# Patient Record
(demographics unavailable — no encounter records)

---

## 2025-01-27 NOTE — PROCEDURE
[Abnormal Uterine Bleeding] : abnormal uterine bleeding [Transvaginal Ultrasound] : transvaginal ultrasound [FreeTextEntry3] : polyp essentially unchanged trace circ: 3.44 cm all ok no free fluid cervix normal [FreeTextEntry5] : 71. 3cc, 4 mm [FreeTextEntry7] : 2.6 cc [FreeTextEntry8] : 2.0 cc

## 2025-01-27 NOTE — PHYSICAL EXAM
[Chaperone Present] : A chaperone was present in the examining room during all aspects of the physical examination [[id number]] : A chaperone was present during the pelvic exam. [FreeTextEntry2] : luna [Labia Majora] : normal [Labia Minora] : normal [Normal] : normal [Enlarged ___ wks] : enlarged [unfilled] ~Uweeks [Uterine Adnexae] : normal

## 2025-07-28 NOTE — HISTORY OF PRESENT ILLNESS
[Irregular Menstrual Interval] : irregular menstrual interval [FreeTextEntry1] : HERE FOR HER ANNUAL SUSPECTED UTI  PERIODS IRREGULAR

## 2025-07-28 NOTE — PROCEDURE
[Abnormal Uterine Bleeding] : abnormal uterine bleeding [Transvaginal Ultrasound] : transvaginal ultrasound [FreeTextEntry3] : UTERUS SMALLER NO FREE FLUID LINING THIN CERVIX NORMAL [FreeTextEntry5] : 60 CC VOL,  3 MM [FreeTextEntry7] : 2.3 CC [FreeTextEntry8] : 2.1 CC

## 2025-07-28 NOTE — PHYSICAL EXAM
[Chaperoned Physical Exam] : A chaperone was present in the examining room during all aspects of the physical examination. [MA] : MA [Examination Of The Breasts] : a normal appearance [No Masses] : no breast masses were palpable [Labia Majora] : normal [Labia Minora] : normal [Uterine Adnexae] : normal [Normal] : normal [Enlarged ___ wks] : enlarged [unfilled] ~Uweeks [FreeTextEntry2] : HERB